# Patient Record
(demographics unavailable — no encounter records)

---

## 2018-06-05 NOTE — US
EXAMINATION TYPE: US venous doppler duplex LE BI

 

DATE OF EXAM: 6/5/2018 6:07 PM

 

COMPARISON: NONE

 

CLINICAL HISTORY: M79.661 Rt low leg pain,R06.02 Shortness of breath. Right leg pain

 

SIDE PERFORMED: Right  

 

TECHNIQUE:  The lower extremity deep venous system is examined utilizing real time linear array sonog
mino with graded compression, doppler sonography and color-flow sonography.

 

VESSELS IMAGED:

External Iliac Vein (EIV)

Common Femoral Vein

Deep Femoral Vein

Greater Saphenous Vein *

Femoral Vein

Popliteal Vein

Small Saphenous Vein *

Proximal Calf Veins

(* superficial vessels)

 

 

 

Right Leg:  Negative for DVT

 

 

No evidence of DVT right leg.

 

IMPRESSION:

1. Right lower extremity ultrasound negative for deep venous thrombosis

## 2018-06-06 NOTE — CT
EXAMINATION TYPE: CT chest wo con

 

DATE OF EXAM: 6/5/2018

 

COMPARISON: Chest radiograph dated 9/25/2016

 

HISTORY: Shortness of breath, abnormal cxr at an outside institution.

 

CT DLP: 127.7 mGycm.  Automated Exposure Control for Dose Reduction was Utilized.

 

 

TECHNIQUE:  CT scan of the thorax is performed without IV contrast.

 

FINDINGS:

 

LUNGS: Subpleural reticulation is seen throughout the lungs more pronounced on the right than the lef
t. Very mild lower lobe bronchiectasis is noted. Biapical pleural parenchymal scarring is mild. The l
ungs are grossly clear, there is no concerning parenchymal mass or nodule identified.   There is no p
leural effusion or pneumothorax seen.  The tracheobronchial tree is patent.

 

MEDIASTINUM: Ascending thoracic aorta is enlarged measuring 4.3 cm on coronal series 6 image 26. Desc
ending thoracic aorta is tortuous but within normal limits measuring 3.0 cm. Moderate atherosclerosis
 is seen of the thoracic aorta. Main pulmonary artery is also within normal limits. Severe three-vess
el coronary artery calcifications are seen. Prominent 1.2 cm short axis precarinal lymph node is note
d. Lack of IV contrast is noted to limit evaluation for mediastinal and especially hilar adenopathy. 
No axillary adenopathy is seen. No cardiomegaly or pericardial effusion is seen. 

 

OTHER: There is minimal bilateral overall symmetric retroareolar gynecomastia moderate multilevel deg
enerative changes of the thoracic spine are noted.

 

IMPRESSION: 

1. Ascending thoracic aortic aneurysm (4.3 cm). Surveillance is recommended.

2. Subpleural reticulation throughout the lungs and may represent atelectasis, can be seen physiologi
susan in aging independent of smoking, or may represent early fibrosis. No focal consolidation or pul
monary mass.

3. Solitary prominent mediastinal lymph node, low suspicion for malignancy. This may be reactive.

## 2018-12-07 NOTE — XR
EXAMINATION TYPE: XR abdomen acute w cxr

 

DATE OF EXAM: 12/7/2018

 

COMPARISON: None

 

HISTORY: Abdominal cramping

 

TECHNIQUE: Acute abdominal series performed with a frontal chest upright and supine views of the abdo
men.

 

FINDINGS: Nonspecific bowel gas is present. Moderate fecal debris is within the colon to the level th
e rectum. Psoas margins are normal. Organomegaly is not present.

 

No free air is evident. No suspicious differential air-fluid levels are present. Organomegaly is not 
present. Scoliosis is within the thoracolumbar spine. 2 surgical screws are within the left hip.

 

No free air is under the diaphragm. Heart size is normal. Lung fields are clear.

 

IMPRESSION:

1.  Moderate fecal retention.

2. Acute abdominal series is otherwise unremarkable.

## 2018-12-07 NOTE — ED
Nausea/Vomiting/Diarrhea HPI





- General


Chief complaint: Nausea/Vomiting/Diarrhea


Stated complaint: diarrhea


Time Seen by Provider: 12/07/18 11:08


Source: patient, RN notes reviewed, old records reviewed


Mode of arrival: ambulatory


Limitations: no limitations





- History of Present Illness


Initial comments: 





This is a 80-year-old male the ER for evasive nausea vomiting diarrhea.  No 

recent travel history no known sick contacts, no family members with similar 

complaints.  No fevers.  Patient states his symptoms are much improved 

currently he was having some difficulty with urination secondary dehydration.  

Otherwise patient denies any significant complaints of fever, no prior surgical 

history


MD complaint: nausea, vomiting


-: days(s) (2)


Description of Vomiting: food contents, watery


Description of Diarrhea: water


Associated Abdominal Pain: Yes


Location: diffuse


Severity: mild


Severity scale (1-10): 1


Quality: cramping, aching


Consistency: constant


Improves with: none


Worsens with: eating, movement


Context: other (unknown)


Associated Symptoms: nausea/vomiting, weakness





- Related Data


 Home Medications











 Medication  Instructions  Recorded  Confirmed


 


Metoprolol Tartrate [Lopressor] 12.5 mg PO Q48H 12/07/18 12/07/18








 Previous Rx's











 Medication  Instructions  Recorded


 


Flecainide [Tambocor] 50 mg PO Q12HR #60 tab 09/13/16











 Allergies











Allergy/AdvReac Type Severity Reaction Status Date / Time


 


Penicillins Allergy  Rash/Hives Verified 12/07/18 11:01


 


apixaban [From Eliquis] AdvReac  WEAKNESS Verified 12/07/18 11:01














Review of Systems


ROS Statement: 


Those systems with pertinent positive or pertinent negative responses have been 

documented in the HPI.





ROS Other: All systems not noted in ROS Statement are negative.





Past Medical History


Past Medical History: Atrial Fibrillation, Hypertension, Renal Disease


Additional Past Medical History / Comment(s): MALARIA WHEN IN [place]


History of Any Multi-Drug Resistant Organisms: None Reported


Past Surgical History: Hernia Repair


Additional Past Surgical History / Comment(s): lt hip surgery,ing hernia repair

, rt arm sx


Additional Past Anesthesia/Blood Transfusion Reaction / Comment(s): no past 

blood transfusion


Past Psychological History: No Psychological Hx Reported


Smoking Status: Never smoker


Past Alcohol Use History: None Reported


Past Drug Use History: None Reported





- Past Family History


  ** Father


Family Medical History: Myocardial Infarction (MI)





  ** Mother


Family Medical History: No Reported History





General Exam


Limitations: no limitations


General appearance: alert, in no apparent distress


Head exam: Present: atraumatic, normocephalic, normal inspection


Eye exam: Present: normal appearance, PERRL, EOMI.  Absent: scleral icterus, 

conjunctival injection, periorbital swelling


ENT exam: Present: normal exam, mucous membranes moist


Neck exam: Present: normal inspection.  Absent: tenderness, meningismus, 

lymphadenopathy


Respiratory exam: Present: normal lung sounds bilaterally.  Absent: respiratory 

distress, wheezes, rales, rhonchi, stridor


Cardiovascular Exam: Present: regular rate, normal rhythm, normal heart sounds.

  Absent: systolic murmur, diastolic murmur, rubs, gallop, clicks


GI/Abdominal exam: Present: soft, normal bowel sounds.  Absent: distended, 

tenderness, guarding, rebound, rigid


Extremities exam: Present: normal inspection, full ROM, normal capillary 

refill.  Absent: tenderness, pedal edema, joint swelling, calf tenderness


Back exam: Present: normal inspection


Neurological exam: Present: alert, oriented X3, CN II-XII intact


Psychiatric exam: Present: normal affect, normal mood


Skin exam: Present: warm, dry, intact, normal color.  Absent: rash





Course


 Vital Signs











  12/07/18





  10:47


 


Temperature 97.8 F


 


Pulse Rate 91


 


Respiratory 18





Rate 


 


Blood Pressure 147/97


 


O2 Sat by Pulse 99





Oximetry 














Medical Decision Making





- Medical Decision Making





88 male the ER for evasive nausea vomiting diarrhea, patient without 

significant symptoms nausea vomiting or diarrhea here in the ER, x-ray negative 

for any sort of obstruction no abdominal pain.  Mild dehydration, patient given 

adequate fluid here in the ER will be discharged home





- Lab Data


Result diagrams: 


 12/07/18 11:37





 12/07/18 11:37


 Lab Results











  12/07/18 12/07/18 12/07/18 Range/Units





  11:37 11:37 11:37 


 


WBC   10.3   (3.8-10.6)  k/uL


 


RBC   5.17   (4.30-5.90)  m/uL


 


Hgb   16.6   (13.0-17.5)  gm/dL


 


Hct   51.3   (39.0-53.0)  %


 


MCV   99.3   (80.0-100.0)  fL


 


MCH   32.0   (25.0-35.0)  pg


 


MCHC   32.2   (31.0-37.0)  g/dL


 


RDW   12.6   (11.5-15.5)  %


 


Plt Count   94 L   (150-450)  k/uL


 


Neutrophils %   71   %


 


Lymphocytes %   19   %


 


Monocytes %   7   %


 


Eosinophils %   1   %


 


Basophils %   0   %


 


Neutrophils #   7.3   (1.3-7.7)  k/uL


 


Lymphocytes #   1.9   (1.0-4.8)  k/uL


 


Monocytes #   0.7   (0-1.0)  k/uL


 


Eosinophils #   0.1   (0-0.7)  k/uL


 


Basophils #   0.0   (0-0.2)  k/uL


 


Manual Slide Review   Performed   


 


RBC Morphology   Normal   


 


Sodium    138  (137-145)  mmol/L


 


Potassium    4.8  (3.5-5.1)  mmol/L


 


Chloride    107  ()  mmol/L


 


Carbon Dioxide    20 L  (22-30)  mmol/L


 


Anion Gap    11  mmol/L


 


BUN    18  (9-20)  mg/dL


 


Creatinine    1.43 H  (0.66-1.25)  mg/dL


 


Est GFR (CKD-EPI)AfAm    51  (>60 ml/min/1.73 sqM)  


 


Est GFR (CKD-EPI)NonAf    44  (>60 ml/min/1.73 sqM)  


 


Glucose    95  (74-99)  mg/dL


 


Calcium    9.4  (8.4-10.2)  mg/dL


 


Phosphorus    3.3  (2.5-4.5)  mg/dL


 


Magnesium    1.7  (1.6-2.3)  mg/dL


 


Total Bilirubin    1.2  (0.2-1.3)  mg/dL


 


AST    55  (17-59)  U/L


 


ALT    10 L  (21-72)  U/L


 


Alkaline Phosphatase    80  ()  U/L


 


Total Creatine Kinase  252 H    ()  U/L


 


CK-MB (CK-2)  2.6 H    (0.0-2.4)  ng/mL


 


CK-MB (CK-2) Rel Index  1.0    


 


Troponin I  <0.012    (0.000-0.034)  ng/mL


 


Total Protein    7.8  (6.3-8.2)  g/dL


 


Albumin    4.1  (3.5-5.0)  g/dL














- EKG Data


-: EKG Interpreted by Me (EKG shows sinus rhythm rate of 76, , QRS 96, 

QTc 438)





- Radiology Data


Radiology results: report reviewed (X-ray abdominal series and chest shows mild 

fecal retention negative for significant acute disease), image reviewed





Disposition


Clinical Impression: 


 Gastroenteritis, Dehydration





Disposition: HOME SELF-CARE


Condition: Good


Instructions:  Acute Nausea and Vomiting (ED), Acute Diarrhea (ED)


Is patient prescribed a controlled substance at d/c from ED?: No


Referrals: 


Sammy Burris MD [Primary Care Provider] - 1-2 days

## 2018-12-09 NOTE — ED
General Adult HPI





- General


Source: patient, family, EMS, RN notes reviewed


Mode of arrival: EMS


Limitations: no limitations





<Mingo Hunter - Last Filed: 12/09/18 20:57>





<Simon Blanco - Last Filed: 12/09/18 23:13>





- General


Chief complaint: Back Pain/Injury


Stated complaint: weakness


Time Seen by Provider: 12/09/18 20:09





- History of Present Illness


Initial comments: 





Patient is a pleasant 88-year-old male presenting to the emergency department 

of fever.  Patient was in the emergency department recently.  Patient had 

constipation at that time followed by several episodes of diarrhea.  Following 

that patient did have a large bowel movement that was somewhat normal and has 

been feeling well for the past couple of days.  Today patient has felt fatigued 

and achy.  Patient was found to have fever.  Patient did have some discomfort 

in the sacral region externally that has resolved when he got up to walk.  

Patient has no discomfort at all at this time.  No rash noted.  No chest pain 

or dyspnea.  No cough.  No upper a story symptoms.  No abdominal pain.  No 

dysuria. (Mingo Hunter)





- Related Data


 Home Medications











 Medication  Instructions  Recorded  Confirmed


 


Metoprolol Tartrate [Lopressor] 12.5 mg PO Q48H 12/07/18 12/09/18








 Previous Rx's











 Medication  Instructions  Recorded


 


Flecainide [Tambocor] 50 mg PO Q12HR #60 tab 09/13/16


 


Polyethylene Glycol 3350 [Miralax] 17 gm PO DAILY #14 packet 12/07/18











 Allergies











Allergy/AdvReac Type Severity Reaction Status Date / Time


 


gentamicin Allergy  Unknown Verified 12/09/18 20:10


 


Penicillins Allergy  Rash/Hives Verified 12/09/18 20:10


 


apixaban [From Eliquis] AdvReac  WEAKNESS Verified 12/09/18 20:10


 


sertraline [From Zoloft] AdvReac  Hallucinati Verified 12/09/18 20:10





   ons  














Review of Systems


ROS Other: All systems not noted in ROS Statement are negative.


Constitutional: Reports: fever, chills


Eyes: Denies: eye pain


ENT: Denies: ear pain


Respiratory: Denies: cough, dyspnea


Cardiovascular: Denies: chest pain


Endocrine: Denies: fatigue


Gastrointestinal: Denies: abdominal pain


Genitourinary: Denies: urgency, dysuria, frequency


Musculoskeletal: Reports: as per HPI


Skin: Denies: rash





<Mingo Hunter - Last Filed: 12/09/18 20:57>


ROS Other: All systems not noted in ROS Statement are negative.





<Simon Blanco BIBI - Last Filed: 12/09/18 23:13>


ROS Statement: 


Those systems with pertinent positive or pertinent negative responses have been 

documented in the HPI.








Past Medical History


Past Medical History: Atrial Fibrillation, Hypertension, Renal Disease


Additional Past Medical History / Comment(s): MALARIA WHEN IN [place]


History of Any Multi-Drug Resistant Organisms: None Reported


Past Surgical History: Hernia Repair


Additional Past Surgical History / Comment(s): lt hip surgery,ing hernia repair

, left arm sx


Additional Past Anesthesia/Blood Transfusion Reaction / Comment(s): no past 

blood transfusion


Past Psychological History: No Psychological Hx Reported


Smoking Status: Never smoker


Past Alcohol Use History: None Reported


Past Drug Use History: None Reported





- Past Family History


  ** Father


Family Medical History: Myocardial Infarction (MI)





  ** Mother


Family Medical History: No Reported History





<Mingo Hunter - Last Filed: 12/09/18 20:57>





General Exam


Limitations: no limitations


General appearance: alert, in no apparent distress


Head exam: Present: atraumatic


Eye exam: Present: normal appearance, PERRL


ENT exam: Present: normal oropharynx


Neck exam: Present: normal inspection.  Absent: tenderness, meningismus


Respiratory exam: Present: normal lung sounds bilaterally


Cardiovascular Exam: Present: regular rate, normal rhythm


GI/Abdominal exam: Present: soft.  Absent: tenderness


Extremities exam: Present: normal inspection


Back exam: Present: normal inspection, other (Specifically no tenderness or 

rash or abscess or abnormality in the patient's area of previous discomfort at 

the sacrum.).  Absent: tenderness, rash noted


Neurological exam: Present: alert


Psychiatric exam: Present: normal affect, normal mood


Skin exam: Present: normal color.  Absent: rash





<Mingo Hunter - Last Filed: 12/09/18 20:57>





 Vital Signs











  12/09/18 12/09/18 12/09/18





  19:56 19:57 20:10


 


Temperature 100.4 F H  


 


Pulse Rate 97  79


 


Respiratory   12





Rate   


 


Blood Pressure 154/91 154/91 140/73


 


O2 Sat by Pulse 97 92 L 





Oximetry   














  12/09/18 12/09/18 12/09/18





  20:20 20:40 20:50


 


Temperature   


 


Pulse Rate 78 74 75


 


Respiratory 20 24 20





Rate   


 


Blood Pressure 108/64 108/58 103/59


 


O2 Sat by Pulse   





Oximetry   














  12/09/18 12/09/18 12/09/18





  21:20 21:30 21:40


 


Temperature   


 


Pulse Rate 78 75 74


 


Respiratory 18 20 22





Rate   


 


Blood Pressure 109/76 109/76 103/60


 


O2 Sat by Pulse 93 L 95 95





Oximetry   














  12/09/18 12/09/18





  21:50 22:10


 


Temperature  


 


Pulse Rate 74 73


 


Respiratory 20 15





Rate  


 


Blood Pressure 99/58 95/58


 


O2 Sat by Pulse 95 94 L





Oximetry  














EKG Findings





- EKG Comments:


EKG Findings:: Normal sinus rhythm 76.  , for screening AV block.  QRS 

94.  .  QTc 445.  Normal axis.  RSR V1.  T-wave inversion septally.  No 

acute ST change.





<Mingo Hunter - Last Filed: 12/09/18 20:57>





Medical Decision Making





- Lab Data


Result diagrams: 


 12/09/18 20:05








<Mingo Hunter - Last Filed: 12/09/18 20:57>





- Lab Data


Result diagrams: 


 12/09/18 20:05





 12/09/18 20:05





<Simon Blanco - Last Filed: 12/09/18 23:13>





- Medical Decision Making





88-year-old male presenting with sacral pain and fever.  Patient's care was 

signed out at shift change awaiting laboratory studies and imaging.  Laboratory 

studies obtained, patient is elevated white blood cell count 11.9, lactic acid 

of 3.3.  Electrolytes within normal limits.  Urinalysis does show 19 white 

cells.  Both blood culture and urine culture are pending.  CT is performed 

which shows colitis in the rectum as well as a nonobstructing left inguinal 

hernia.  Patient's symptoms likely related to colitis.  He was started on 

Levaquin for urine culture results.  He will be admitted for IV antibiotics, 

and IV fluids.  Case is discussed with admitting physician Dr. Burris, he will 

accept admission.  Gastroenterology placed on consult. (Simon Blanco)





- Lab Data





 Lab Results











  12/09/18 12/09/18 12/09/18 Range/Units





  20:05 20:05 20:05 


 


WBC   11.9 H   (3.8-10.6)  k/uL


 


RBC   4.80   (4.30-5.90)  m/uL


 


Hgb   15.0   (13.0-17.5)  gm/dL


 


Hct   47.5   (39.0-53.0)  %


 


MCV   99.0   (80.0-100.0)  fL


 


MCH   31.4   (25.0-35.0)  pg


 


MCHC   31.7   (31.0-37.0)  g/dL


 


RDW   12.6   (11.5-15.5)  %


 


Plt Count   110 L   (150-450)  k/uL


 


Neutrophils %   83   %


 


Lymphocytes %   10   %


 


Monocytes %   5   %


 


Eosinophils %   1   %


 


Basophils %   0   %


 


Neutrophils #   9.9 H   (1.3-7.7)  k/uL


 


Lymphocytes #   1.2   (1.0-4.8)  k/uL


 


Monocytes #   0.6   (0-1.0)  k/uL


 


Eosinophils #   0.1   (0-0.7)  k/uL


 


Basophils #   0.0   (0-0.2)  k/uL


 


PT     (9.0-12.0)  sec


 


INR     (<1.2)  


 


APTT     (22.0-30.0)  sec


 


Sodium    138  (137-145)  mmol/L


 


Potassium    4.6  (3.5-5.1)  mmol/L


 


Chloride    102  ()  mmol/L


 


Carbon Dioxide    24  (22-30)  mmol/L


 


Anion Gap    12  mmol/L


 


BUN    20  (9-20)  mg/dL


 


Creatinine    1.28 H  (0.66-1.25)  mg/dL


 


Est GFR (CKD-EPI)AfAm    57  (>60 ml/min/1.73 sqM)  


 


Est GFR (CKD-EPI)NonAf    50  (>60 ml/min/1.73 sqM)  


 


Glucose    99  (74-99)  mg/dL


 


Plasma Lactic Acid Anival     (0.7-2.0)  mmol/L


 


Calcium    8.6  (8.4-10.2)  mg/dL


 


Total Bilirubin    0.6  (0.2-1.3)  mg/dL


 


AST    35  (17-59)  U/L


 


ALT    23  (21-72)  U/L


 


Alkaline Phosphatase    67  ()  U/L


 


Total Creatine Kinase  341 H    ()  U/L


 


CK-MB (CK-2)  3.1 H    (0.0-2.4)  ng/mL


 


CK-MB (CK-2) Rel Index  0.9    


 


Troponin I  <0.012    (0.000-0.034)  ng/mL


 


Total Protein    7.0  (6.3-8.2)  g/dL


 


Albumin    3.9  (3.5-5.0)  g/dL


 


Urine Color     


 


Urine Appearance     (Clear)  


 


Urine pH     (5.0-8.0)  


 


Ur Specific Gravity     (1.001-1.035)  


 


Urine Protein     (Negative)  


 


Urine Glucose (UA)     (Negative)  


 


Urine Ketones     (Negative)  


 


Urine Blood     (Negative)  


 


Urine Nitrite     (Negative)  


 


Urine Bilirubin     (Negative)  


 


Urine Urobilinogen     (<2.0)  mg/dL


 


Ur Leukocyte Esterase     (Negative)  


 


Urine RBC     (0-5)  /hpf


 


Urine WBC     (0-5)  /hpf


 


Urine Mucus     (None)  /hpf


 


Influenza Type A RNA     (Not Detectd)  


 


Influenza Type B (PCR)     (Not Detectd)  














  12/09/18 12/09/18 12/09/18 Range/Units





  20:05 20:05 20:50 


 


WBC     (3.8-10.6)  k/uL


 


RBC     (4.30-5.90)  m/uL


 


Hgb     (13.0-17.5)  gm/dL


 


Hct     (39.0-53.0)  %


 


MCV     (80.0-100.0)  fL


 


MCH     (25.0-35.0)  pg


 


MCHC     (31.0-37.0)  g/dL


 


RDW     (11.5-15.5)  %


 


Plt Count     (150-450)  k/uL


 


Neutrophils %     %


 


Lymphocytes %     %


 


Monocytes %     %


 


Eosinophils %     %


 


Basophils %     %


 


Neutrophils #     (1.3-7.7)  k/uL


 


Lymphocytes #     (1.0-4.8)  k/uL


 


Monocytes #     (0-1.0)  k/uL


 


Eosinophils #     (0-0.7)  k/uL


 


Basophils #     (0-0.2)  k/uL


 


PT   10.2   (9.0-12.0)  sec


 


INR   0.9   (<1.2)  


 


APTT   23.4   (22.0-30.0)  sec


 


Sodium     (137-145)  mmol/L


 


Potassium     (3.5-5.1)  mmol/L


 


Chloride     ()  mmol/L


 


Carbon Dioxide     (22-30)  mmol/L


 


Anion Gap     mmol/L


 


BUN     (9-20)  mg/dL


 


Creatinine     (0.66-1.25)  mg/dL


 


Est GFR (CKD-EPI)AfAm     (>60 ml/min/1.73 sqM)  


 


Est GFR (CKD-EPI)NonAf     (>60 ml/min/1.73 sqM)  


 


Glucose     (74-99)  mg/dL


 


Plasma Lactic Acid Anival  3.3 H*    (0.7-2.0)  mmol/L


 


Calcium     (8.4-10.2)  mg/dL


 


Total Bilirubin     (0.2-1.3)  mg/dL


 


AST     (17-59)  U/L


 


ALT     (21-72)  U/L


 


Alkaline Phosphatase     ()  U/L


 


Total Creatine Kinase     ()  U/L


 


CK-MB (CK-2)     (0.0-2.4)  ng/mL


 


CK-MB (CK-2) Rel Index     


 


Troponin I     (0.000-0.034)  ng/mL


 


Total Protein     (6.3-8.2)  g/dL


 


Albumin     (3.5-5.0)  g/dL


 


Urine Color     


 


Urine Appearance     (Clear)  


 


Urine pH     (5.0-8.0)  


 


Ur Specific Gravity     (1.001-1.035)  


 


Urine Protein     (Negative)  


 


Urine Glucose (UA)     (Negative)  


 


Urine Ketones     (Negative)  


 


Urine Blood     (Negative)  


 


Urine Nitrite     (Negative)  


 


Urine Bilirubin     (Negative)  


 


Urine Urobilinogen     (<2.0)  mg/dL


 


Ur Leukocyte Esterase     (Negative)  


 


Urine RBC     (0-5)  /hpf


 


Urine WBC     (0-5)  /hpf


 


Urine Mucus     (None)  /hpf


 


Influenza Type A RNA    Not Detected  (Not Detectd)  


 


Influenza Type B (PCR)    Not Detected  (Not Detectd)  














  12/09/18 Range/Units





  21:08 


 


WBC   (3.8-10.6)  k/uL


 


RBC   (4.30-5.90)  m/uL


 


Hgb   (13.0-17.5)  gm/dL


 


Hct   (39.0-53.0)  %


 


MCV   (80.0-100.0)  fL


 


MCH   (25.0-35.0)  pg


 


MCHC   (31.0-37.0)  g/dL


 


RDW   (11.5-15.5)  %


 


Plt Count   (150-450)  k/uL


 


Neutrophils %   %


 


Lymphocytes %   %


 


Monocytes %   %


 


Eosinophils %   %


 


Basophils %   %


 


Neutrophils #   (1.3-7.7)  k/uL


 


Lymphocytes #   (1.0-4.8)  k/uL


 


Monocytes #   (0-1.0)  k/uL


 


Eosinophils #   (0-0.7)  k/uL


 


Basophils #   (0-0.2)  k/uL


 


PT   (9.0-12.0)  sec


 


INR   (<1.2)  


 


APTT   (22.0-30.0)  sec


 


Sodium   (137-145)  mmol/L


 


Potassium   (3.5-5.1)  mmol/L


 


Chloride   ()  mmol/L


 


Carbon Dioxide   (22-30)  mmol/L


 


Anion Gap   mmol/L


 


BUN   (9-20)  mg/dL


 


Creatinine   (0.66-1.25)  mg/dL


 


Est GFR (CKD-EPI)AfAm   (>60 ml/min/1.73 sqM)  


 


Est GFR (CKD-EPI)NonAf   (>60 ml/min/1.73 sqM)  


 


Glucose   (74-99)  mg/dL


 


Plasma Lactic Acid Anival   (0.7-2.0)  mmol/L


 


Calcium   (8.4-10.2)  mg/dL


 


Total Bilirubin   (0.2-1.3)  mg/dL


 


AST   (17-59)  U/L


 


ALT   (21-72)  U/L


 


Alkaline Phosphatase   ()  U/L


 


Total Creatine Kinase   ()  U/L


 


CK-MB (CK-2)   (0.0-2.4)  ng/mL


 


CK-MB (CK-2) Rel Index   


 


Troponin I   (0.000-0.034)  ng/mL


 


Total Protein   (6.3-8.2)  g/dL


 


Albumin   (3.5-5.0)  g/dL


 


Urine Color  Yellow  


 


Urine Appearance  Clear  (Clear)  


 


Urine pH  5.5  (5.0-8.0)  


 


Ur Specific Gravity  1.011  (1.001-1.035)  


 


Urine Protein  Negative  (Negative)  


 


Urine Glucose (UA)  Negative  (Negative)  


 


Urine Ketones  Negative  (Negative)  


 


Urine Blood  Negative  (Negative)  


 


Urine Nitrite  Negative  (Negative)  


 


Urine Bilirubin  Negative  (Negative)  


 


Urine Urobilinogen  <2.0  (<2.0)  mg/dL


 


Ur Leukocyte Esterase  Small H  (Negative)  


 


Urine RBC  1  (0-5)  /hpf


 


Urine WBC  19 H  (0-5)  /hpf


 


Urine Mucus  Rare H  (None)  /hpf


 


Influenza Type A RNA   (Not Detectd)  


 


Influenza Type B (PCR)   (Not Detectd)  














Disposition





<Mingo Hunter - Last Filed: 12/09/18 20:57>


Is patient prescribed a controlled substance at d/c from ED?: No


Decision to Admit Reason: Admit from EC


Decision Date: 12/09/18


Decision Time: 23:13





<Simon Blanco - Last Filed: 12/09/18 23:13>


Clinical Impression: 


 Dehydration, Colitis





Disposition: ADMITTED AS IP TO THIS HOSP


Condition: Stable


Referrals: 


Sammy Burris MD [Primary Care Provider] - 1-2 days

## 2018-12-09 NOTE — CT
EXAMINATION TYPE: CT abdomen pelvis w con

 

DATE OF EXAM: 12/9/2018

 

COMPARISON: None

 

HISTORY: Fever and weakness.

 

CT DLP: 578.6 mGycm

Automated exposure control for dose reduction was used.

 

TECHNIQUE:  Helical acquisition of images was performed from the lung bases through the pelvis.

 

CONTRAST: 

Performed without Oral Contrast and with IV Contrast, patient injected with 80ml mL of Isovue 300.

 

FINDINGS: 

 

There is subsegmental atelectasis at the lung bases. Heart is enlarged. There is no pericardial effus
ion. Liver shows no focal defect. Gallbladder appears normal. Spleen appears normal. There is no panc
reatic mass.

 

There is no adrenal mass. There are small bilateral renal cortical cysts. There is no hydronephrosis.
 There is no retroperitoneal adenopathy. Abdominal aorta is atheromatous. Bladder distends smoothly. 
There is left side inguinal hernia and scrotal hernia. This contains descending colon and fat. I see 
no evidence of a bowel obstruction. There is a left hip nailing noted. There is no free fluid in the 
pelvis. There is mild wall thickening of the rectum.

 

Lumbar vertebra have normal spacing. There is no compression fracture. Posterior elements are intact.
 Bony pelvis is intact. There is mild thoracolumbar dextroscoliosis.

 

There is no evidence of mesenteric edema or adenopathy. There is no evidence of a bowel obstruction.

IMPRESSION: 

THERE IS WALL THICKENING OF THE RECTUM THAT COULD RELATE TO FOCAL COLITIS.

 

LEFT INGUINAL HERNIA WITHOUT EVIDENCE OF OBSTRUCTION OF THE DESCENDING COLON.

 

PATCHY SUBSEGMENTAL ATELECTASIS AT THE LUNG BASES. CARDIOMEGALY.

## 2018-12-09 NOTE — XR
EXAMINATION TYPE: XR chest 2V

 

DATE OF EXAM: 12/9/2018

 

COMPARISON: 9/25/2016

 

HISTORY: Fever

 

TECHNIQUE:  Frontal and lateral views of the chest are obtained.

 

FINDINGS:  There is no heart failure nor confluent pneumonic infiltrate. Thoracic aorta is atheromato
us and tortuous. Costophrenic angles are clear. Bony thorax appears intact.

 

IMPRESSION:  No active cardiopulmonary disease. There is clearing of the pleural fluid and mild pulmo
nary congestion compared to old exam.

## 2018-12-10 NOTE — HP
HISTORY AND PHYSICAL



DATE OF SERVICE:

12/10/2018





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



DATA:  FULL CODE.  He is 5 foot 5 inches height.  His weight 62.142 kg, BSA 1.68 m2,

BMI 22.8 kg/m2.



His allergy to GENTAMICIN, PENICILLIN, APIXABAN, SERTRALINE.

He has no allergy to cephalosporin.



CHIEF COMPLAINT:

Patient presented to the emergency room with the underlying abdominal pain, as well as

that he felt weak and feverish and chills.



HISTORY OF PRESENT ILLNESS:

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

has at that time abdominal pain, where he has as well feeling of constipation and he

had at that time, Dulcolax tablet to them and resulted in severe lower abdominal pain.

After his visit, he went home and he was stable at the time and he had a large bowel

movement and he felt comfortable.



Yesterday. Sunday, 12/09, he went exercise walking in the mall and with his return

back, he had a shower, but after the shower, he felt shivering and this shivering did

not stop. That was happening around 4:30 pm to 7 pm.  Subsequently they called the

ambulance, his wife and took him to the emergency room.  In the emergency room and

found that his blood pressure was low at the time of presentation and the blood

pressure was her his temperature as well was 100.4. blood pressure started as level of

154/91 and subsequently gradually dropped to 108/58 and with instability, his oxygen

saturation also went down from 97 to 92.  And the patient with this finding and the

abdominal pain and shivering underwent CT scan of the abdomen.  The CT scan of the

abdomen was indicating that he has underlying wall thickening in the rectum and could

be focal colitis at the time.  With the history of constipation and after that, large

frequent bowel movement until he was cleaned up and could be that is irritation.

However, the hypotension that occurred subsequently that could be another issue.  Found

also in the CT scan that he had the left inguinal hernia and without evidence of

obstruction of the descending colon.  Also, he had a patchy atelectasis in the lung

bases with cardiomegaly.  In the ER as well, we did a chest x-ray and the chest x-ray

was indicating mild pulmonary congestion compared with the old exam.  No active

pulmonary disease.  His EKG showed to be normal sinus rhythm with incomplete right

bundle branch block and nonspecific ST wave abnormality with the abnormal EKG and the

patient has been on flecainide by Dr. Mercedes, the cardiologist, with a history of

atrial fibrillation in the past and he has been sinus since he has been taken this

medication.  He had history of mild hypertension in the past, but he has currently

hypotension.  He, subsequently, as seen in the emergency room and the impression that

presented to them was fever and patient had has a history mentioned above, history of

constipation and followed by several diarrhea and large bowel movements.  Patient felt

fatigued and achy and the pain was in the sacral area and was burning sensation on the

anal rectal area.  However, he walked and he felt resolved.  No chest pain and no cough

and no abdominal pain at the time of presentation to the emergency room.



Current rest of medication is metoprolol tartrate 12.5 mg p.o. every 48 hours.  He is

on flecainide, which is Tambocor which is flecainide 50 mg twice a day and he is on

polyethylene glycol, MiraLAX, which has been stopped on the admission.



He is allergic to GENTAMICIN, PENICILLIN, APIXABAN, and SERTRALINE.



PAST MEDICAL HISTORY:

Left hip surgery with screw and pins with fracture.  The patient , he has 3

daughters.



REVIEW OF THE SYSTEM:

Essentially negative except constitutional he had a fever and chills.  Denied any pain

in the eye or the ear.  Denied any respiratory symptoms, cough and dyspnea.  Denied any

cardiovascular symptoms, chest pain or anginal pain. Endocrine was negative, but he

felt fatigued and tired and found that he has CPK, the muscle fraction was elevated,

which has been subsequently after he was exercising, walking in the mall.

Genitourinary, denied any urgency or dysuria or frequency and musculoskeletal, just

history of exercising in the mall.  Skin, no skin rash.  GI, the feeling of pain in the

sacral area and perianal area and burning feeling, probably could be related after he

had the frequent large bowel movement.



The past medical history, he had hernia repair.  He had also left hip surgery and he

had history of atrial fib, hypertension, and chronic kidney disease, mild; however,

today is progressive.  He has remote history of malaria and in [place], remote.  No

psychological history.



Never smoked.  No alcohol intake.  No drug use.



FAMILY HISTORY:

Father, myocardial infarction.  Mother, Raynaud's disease.



PHYSICAL EXAMINATION:

Patient was conscious, alert, oriented x3 at the time of the examination, his blood

pressure is still low in the 93/54 with a mean 67 and subsequently 91/56.  His pulse ox

was in the 92-93.  His pulse rate was 63-65.

HEENT:  The head was normocephalic, atraumatic.  Pupil was reactive and equal.

Conjunctivae was pink, sclerae was nonicteric and the oropharynx natural teeth and

uvula midline and normal tongue here.  Normal hearing and neck was supple.  No

lymphadenopathy.  Trachea midline.  No thyromegaly.  Chest was clear to auscultation

and percussion.  No wheezes, no rhonchi.  The heart, PMI in the fifth intercostal space

and normal S1, S2.  No gallop.  The abdomen was soft with some epigastric discomfort

and lower abdomen discomfort in the abdomen is positive bowel sounds and not

tympanitic.

EXTREMITIES:  No edema, but decreased pulses on both dorsalis pedis and posterior

tibial as well as on the femoral and popliteal pulses.  The purplish discoloration of

the foot and hand with exposure to the cold with the underlying Raynaud's disease.  He

had also an onychomycosis of the toenails with nail dystrophy bilateral.



LABORATORY:

On admission indicating lactic acidosis with the lactic acid was 3.3 and; however when

we repeat again, it has improved to 0.8.  He had his leukocytosis with the WBC is 11.9

and the hemoglobin was normal 15, hematocrit 47.  Patient also had thrombocytopenia;

however, he has no bleeding tendency with the platelet count 110.  His neutrophil was

9.9, which is increased.  He has a PT and INR within normal limit and he has chemistry

profile on admission and that was indicating normal electrolyte with sodium 138, and

carbon dioxide was 24.  However, the creatinine is 1.28 and with the subsequent labs

indicating that progression of the creatinine with the normal glucose.  However, the

repeat again his blood sugar was 68.  The patient did not eat at that time.  The

underlying CK was 341 on admission with muscle fraction is 3.1, the CK-MB and that is

considered mild post-exercise rhabdo.  His troponin was normal less than 0.012 and the

total protein 7, albumin 3.9, and his urinalysis was indicating that he had a small

leukocyte esterase with white cells is 19, suspicious of pyuria was considered or

urinary tract infection including prostatitis.  Repeat lab this morning was indicating

that the creatinine went up with the estimated glomerular filtration rate for non-

 for him when he admitted was 50 and now it is 48.  We started him back

again on the IV fluid, which was discontinued during the night.



IMPRESSION:

1. Dehydration.

2. Acute kidney injury on the top of chronic with chronic kidney disease stage III.

3. Underlying mild elevation of creatine kinase considering mild rhabdo.  Lactic

    acidosis could be associated with a 3.3 lactic acid.  Peripheral vascular disease

    with pulses bilaterally decreased.

4. Thickening of the rectum by the CAT scan with the underlying focal colitis and

    possible proctitis with the symptoms of anal burning.

5. Thrombocytopenia with no active bleeding.

6. History of atrial fibrillation, currently sinus.

7. Incomplete right bundle branch block by the EKG.

8. Underlying left hip arthroplasty.

9. Currently, inguinal hernia by the CT scan; however, nonsymptomatic.



PLAN:

1. We are repeating laboratory.

2. Hydration with IV gentle hydration with IV fluids 75 mL/hour normal saline.

3. Monitoring the renal function.

4. Consultation with Dr. Samaniego/Gastroenterology/Dr. Valdez the new gastroenterology

    and I did discuss it with him personally that he will be seeing him today.  The

    underlying thrombocytopenia and without any active bleeding and will be checking

    again the laboratory tomorrow and his PT and INR is normal and we will be

    ambulating him from day 1 with no need for DVT prophylaxis as he always active for

    ambulation.

5. We will be obtaining bilateral arterial duplex as SORIN, we do not do it in the

    hospital and we will get the ultrasound of the arterial side.

6. Tomorrow will check also his CPK for a solution of the mild rhabdo.

7. Patient was started in the ER on Levaquin with the history of proctitis and burning

    inflammation, will be changing the antibiotic to Rocephin as well as Rocephin, and

    Flagyl until seen by the Gastroenterology and we will wait for the results of the

    arterial duplex study of the lower extremity.  Further treatment depends on the

    patient's condition.





MMODL / IJN: [id number] / Job#: [id number]

## 2018-12-11 NOTE — P.DS
Providers


Date of admission: 


12/09/18 23:09





Attending physician: 


Sammy Burris





Consults: 





 





12/09/18 23:09


Consult Physician Routine 


   Consulting Provider: Emani Samaniego


   Consult Reason/Comments: Colitis


   Do you want consulting provider notified?: Yes, Notify in am











Primary care physician: 


Sammy Burris


This is dictation on discharge summary date of service 12/11/2018.


Final diagnosis


#1 hypotension #2 acute HTN recovering with the underlying chronic kidney 

disease stage III.


#3 proctitis associated with stool retention followed by diarrhea.


#4 atrial fibrillation with a controlled ventricular response rate of 70s.


#5 history of hypertension controlled.


#6 dehydration corrected.


#7 mild elevation of CPK associated with minimal rhabdo post exercise.


#8 lactic acidosis abnormal elevation 3.3 on admission.


#9 underlying Raynaud's phenomena with the purplish discoloration of the hand 

and feet with the cold weather associated with negative DNA and negative DNA.


Consultation with GI: Dr. gupta gastroenterologist.  


Patient presented with fever as well as feeding generalized weakness with the 

hypotension associated with the increase creatinine however patient also had 

computed tomography scan of the abdomen was contrast.


With the dehydration and rectal thickening consultation with the 

gastroenterology and started on IV Rocephin as well as Flagyl.  Patient 

hydrated with IV normal saline and given for one dose of steroid Solu-Medrol 

125 mg IV piggyback with the possibility of adrenal gland suppression meanwhile 

we obtain the cortisol level prior to the injection.


Subsequently blood pressure improved with from the the 90 systolic currently is 

135/67/134/67 with the saturation of the oxygen 98 and afebrile with the 

temperature 97.5 and pulse rate regular 71 with a respiratory rate 16.


His white count improved from 11.9-7.2 and hemoglobin stable 14 and hematocrit 

44.2.  His chemistry profile today indicating sodium 139 potassium 4.7 chloride 

110 carbon dioxide 22 BUN 18 creatinine 1.31 with estimated glomerular 

filtration rate 56 and his blood sugar was low currently his was 86 yesterday 

today is 141.  He is magnesium is low 1.5, patient to had elevated lactic acid 

on admission 3.3 with hydration resolved to 0.8.  His liver enzyme within 

normal limits CPKMB 2.4 his cortisol level was 6 and ANAs screen was negative 

and DNA double-stranded negative and the anti-DNA less than 1, influenza A not 

detected influenza B not detected.


Urine culture was negative.  After 24 hour period





Presentation to the emergency room all was 88 years old white male admitted 

with a fever and he had history of constipation came to the ER followed by 

diarrhea after he take 2 tablets of stool laxative.  He felt at the time of 

admission fever with lactic acid elevated 3.3 and dehydrated with elevated 

creatinine .


Patient subsequently admitted to the hospital hydrated and found that he had 

hypotension and continue the hydration and started on supporting the adrenal 

gland was 1 dose of Solu-Medrol 125 mg and a hyper hypotension has been result 

as well patient also did not take his metoprolol 12.5 every 48 hour and 

subsequently he felt fine and blood pressure today is stable.  Patient is 

ambulatory and patient did not need any DVT prophylaxis.  As he ambulatory in 

the lainez as well as in the room. 





On physical exam:


Patient's conscious alert oriented 3 and he feeling great at this time and 

that he wants to go home.


Vital sign today temperature 97.5 pulse 71 respiratory rate 16 blood pressure 

134/76 with a mean 95 he has pulse ox 98% on room air.


Laboratory discussed with the patient and the creatinine started to improve 

with the increase fluid intake he has underlying chronic kidney disease stage 

III with the estimated glomerular filtration rate 48, patient magnesium is 1.5 

and we will give him on a gram of magnesium prior to discharge.  And a 

creatinine 1.31 yesterday was 1.33.  Patient has also magnesium low 1.5 and 

will give him a gram of magnesium before discharge home today.





HEENT the head was normocephalic and atraumatic pupils equal reactive 

oropharynx was normal with the uvula midline normal tongue neck was supple no 

JVD no thyromegaly no lymphadenopathy trachea midline.


Chest was clear auscultation percussion.  Heart was regular sinus rhythm.  

Abdomen was soft nontender positive bowel sounds no perineal burning or pain no 

pain with urination and no suprapubic tenderness and able to ambulate and 

extremities no edema and positive pulses.


Neurologically: No headache no blurred vision no lateralizing sign no cranial 

nerve deficit and no motor or sensory deficit.





Patient stable general condition for discharge home today to follow-up with Dr. gupta the gastroenterology for the underlying proctitis.


Patient will be receiving Flagyl only 500 mg 3 times a day for 5 days.  And new


We'll be following help this regard We're in the office.  And increase activity 

and oral intake thank you is end of dictation [facility] 

end dictation


Patient Condition at Discharge: Stable





Plan - Discharge Summary


Discharge Rx Participant: No


New Discharge Prescriptions: 


No Action


   Flecainide [Tambocor] 50 mg PO Q12HR #60 tab


   Metoprolol Tartrate [Lopressor] 12.5 mg PO Q48H


   Polyethylene Glycol 3350 [Miralax] 17 gm PO DAILY #14 packet


Discharge Medication List





Flecainide [Tambocor] 50 mg PO Q12HR #60 tab 09/13/16 [Rx]


Metoprolol Tartrate [Lopressor] 12.5 mg PO Q48H 12/07/18 [History]


Polyethylene Glycol 3350 [Miralax] 17 gm PO DAILY #14 packet 12/07/18 [Rx]








Follow up Appointment(s)/Referral(s): 


Sammy Burris MD [Primary Care Provider] - 1-2 days

## 2018-12-11 NOTE — P.CONS
History of Present Illness





- Reason for Consult


Consult date: 12/10/18


Proctitis


Requesting physician: Sammy Burris





- Chief Complaint


Chills, fatigue





- History of Present Illness





The patient is a very pleasant 88-year-old male with medical history 

significant for atrial fibrillation and hypertension who presents to the 

hospital with complaints of chills and fatigue.  Per the patient she was 

recently seen on 12/70 in the emergency department after presenting for 

complaints of constipation.  The patient reports that he was very constipated 

and presented for impaction.  The patient subsequently had a bowel movement 

which was followed by loose stool which is currently resolved.  He presented 

back to the ER for further evaluation after feeling cold, chills and for 

fatigue.  In the emergency department the patient had a computed tomography 

scan of the abdomen which was significant for wall thickening of the rectum as 

well as a left inguinal hernia and atelectasis of the lungs.  The patient 

reports that currently his symptoms have resolved.  He reports that he does 

suffer from constipation at baseline and that starting laxative therapy.  He 

denies any abdominal pain.  He reports a remote history of a colonoscopy.  He 

denies any rectal or GI bleeding.  Hemoglobin and liver enzymes were normal on 

presentation.





Review of Systems





REVIEW OF SYSTEMS:


CARDIO: Denies any chest pain or palpitations, she does have a known history of 

atrial fibrillation.


PULMONARY: Denies any shortness of breath or wheezing.


GENITOURINARY:  No dysuria or hematuria. 


MUSCULOSKELETAL: No weakness reported. 


SKIN: Denies any new rashes or lesions, jaundice or pallor. 


PSYCHIATRIC: Denies any depression or anxiety. 


NEUROLOGY: Denies headache, denies any new focal deficits. 


EARS: No tinnitus, discharge or new hearing loss.


NOSE: No discharge or congestion.


EYES: No pain in eyes or change in vision. 


CONSTITUTIONAL: No recent weight loss. No fever, chills, night sweats.





Past Medical History


Past Medical History: Atrial Fibrillation, Hypertension


Additional Past Medical History / Comment(s): MALARIA WHEN IN [place]


History of Any Multi-Drug Resistant Organisms: None Reported


Past Surgical History: Hernia Repair


Additional Past Surgical History / Comment(s): lt hip surgery,ing hernia repair

, left elbow sx


Additional Past Anesthesia/Blood Transfusion Reaction / Comm: no past blood 

transfusion


Past Psychological History: No Psychological Hx Reported


Additional Psychological History / Comment(s): pt is a retired thoracic, 

vascular surgeon, lives with hsi wife, is independant.


Smoking Status: Never smoker


Past Alcohol Use History: None Reported


Past Drug Use History: None Reported





- Past Family History


  ** Father


Family Medical History: Myocardial Infarction (MI)





  ** Mother


Family Medical History: No Reported History





Medications and Allergies


 Home Medications











 Medication  Instructions  Recorded  Confirmed  Type


 


Flecainide [Tambocor] 50 mg PO Q12HR #60 tab 09/13/16 12/09/18 Rx


 


Metoprolol Tartrate [Lopressor] 12.5 mg PO Q48H 12/07/18 12/09/18 History


 


Polyethylene Glycol 3350 [Miralax] 17 gm PO DAILY #14 packet 12/07/18 12/09/18 

Rx











 Allergies











Allergy/AdvReac Type Severity Reaction Status Date / Time


 


gentamicin Allergy  Unknown Verified 12/09/18 20:10


 


Penicillins Allergy  Rash/Hives Verified 12/09/18 20:10


 


apixaban [From Eliquis] AdvReac  WEAKNESS Verified 12/09/18 20:10


 


sertraline [From Zoloft] AdvReac  Hallucinati Verified 12/09/18 20:10





   ons  














Physical Exam


Vitals: 


 Vital Signs











  Temp Pulse Pulse Pulse Resp BP BP


 


 12/10/18 20:09    64    


 


 12/10/18 20:02  98.0 F    68  12   111/58


 


 12/10/18 16:07      17  


 


 12/10/18 15:00  97.9 F    68  16   110/64


 


 12/10/18 07:00  98.4 F    60  17   107/68


 


 12/10/18 02:39  98.0 F    65  14   115/69


 


 12/10/18 02:00  99.4 F  73     104/63 


 


 12/10/18 01:40       91/56 


 


 12/10/18 01:30   63    19  96/67 


 


 12/10/18 01:20   65    6 L  96/67 


 


 12/10/18 01:10   64    19  98/60 


 


 12/10/18 01:00   63    21  93/54 


 


 12/10/18 00:50   64    21  93/54 


 


 12/10/18 00:40   66    19  95/58 


 


 12/10/18 00:20   65    16  100/62 


 


 12/10/18 00:10   67    20  92/55 


 


 12/10/18 00:00   69    16  102/60 


 


 12/09/18 23:50   74    16  84/56 














  Pulse Ox


 


 12/10/18 20:09 


 


 12/10/18 20:02  99


 


 12/10/18 16:07 


 


 12/10/18 15:00  100


 


 12/10/18 07:00  97


 


 12/10/18 02:39  99


 


 12/10/18 02:00  96


 


 12/10/18 01:40 


 


 12/10/18 01:30  93 L


 


 12/10/18 01:20  92 L


 


 12/10/18 01:10  92 L


 


 12/10/18 01:00  92 L


 


 12/10/18 00:50  93 L


 


 12/10/18 00:40  94 L


 


 12/10/18 00:20  93 L


 


 12/10/18 00:10  93 L


 


 12/10/18 00:00  93 L


 


 12/09/18 23:50  93 L








 Intake and Output











 12/10/18 12/10/18 12/11/18





 14:59 22:59 06:59


 


Other:   


 


  # Voids 1  














On physical examination, patient appears comfortable in no apparent distress. 


HEAD: Normocephalic, atraumatic. 


EYES: No scleral icterus. No conjunctival injection. 


MOUTH: No lesions, tongue midline. 


NECK: Trachea midline, no gross abnormalities. 


CHEST: Clear to auscultation with no wheezing or rhonchi appreciated. 


HEART: Irregularly irregular. 


ABDOMEN: Soft. Bowel sounds are positive. No organomegaly.  No guarding or 

rigidity.


EXTREMITIES: No pedal edema. 


SKIN: No rashes, no jaundice. 


NEUROLOGIC: Alert and oriented x3.  No focal deficits. 





Results


CBC & Chem 7: 


 12/09/18 20:05





 12/10/18 09:23


Labs: 


 Abnormal Lab Results - Last 24 Hours (Table)











  12/10/18 Range/Units





  09:23 


 


Creatinine  1.33 H  (0.66-1.25)  mg/dL


 


Glucose  68 L  (74-99)  mg/dL


 


Total Protein  6.1 L  (6.3-8.2)  g/dL


 


Albumin  3.1 L  (3.5-5.0)  g/dL








 Microbiology - Last 24 Hours (Table)











 12/09/18 20:05 Blood Culture - Preliminary





 Blood    No Growth after 24 hours


 


 12/09/18 21:08 Urine Culture - Preliminary





 Urine,Clean Catch 











CT scan - abdomen: report reviewed (computed tomography scan of the abdomen 

which was significant for wall thickening of the rectum as well as a left 

inguinal hernia and atelectasis of the lungs.)





Assessment and Plan


(1) Colitis


Narrative/Plan: 


Patient with a known history of constipation who was recently seen in the 

emergency department it is severe ulceration and impaction.  He presented back 

with symptoms of chills and fatigue and was found to have CT findings of rectal 

wall thickening area he recently had loose bowel movements after presenting 

with impaction.  Currently he is reporting that his symptoms are resolved.  

Unclear etiology and may represent local changes secondary to constipation and 

impaction, also consider infectious colitis or other etiology.


Current Visit: Yes   Status: Acute   Code(s): K52.9 - NONINFECTIVE 

GASTROENTERITIS AND COLITIS, UNSPECIFIED   SNOMED Code(s): 23294951


   





(2) Atrial fibrillation with RVR


Current Visit: No   Status: Acute   Code(s): I48.91 - UNSPECIFIED ATRIAL 

FIBRILLATION   SNOMED Code(s): 420921816114491


   


Plan: 





Supportive care


Okay for diet


Continue short course of antibiotics currently on Rocephin and Flagyl


Recommend outpatient follow-up with gastroenterology in consideration for 

colonoscopic evaluation


Recommend bowel regimen at home, have discussed the use of fiber 

supplementation with Benefiber or the addition of daily MiraLAX


Thank you for allowing us to participate in the care of this patient we will 

continue to follow

## 2018-12-12 NOTE — P.ARTDOP
Arterial Doppler





LOWER EXTREMITY ARTERIAL DOPPLER:





DATE OF SERVICE: 12/10/2018





Reason for study: Pain.





Doppler waveforms: Multiphasic bilaterally throughout.





Pulse volume recording: Normal configuration.





Pressure gradients: Mild gradient distally on the right.





Ankle-brachial indices: 0.86 on the right and greater than 1 on the left.





Toe pressures: [] on the right, [] on the left





Impression: Left side appears to be normal.  Right side shows possible mild 

femoral popliteal disease.  Not likely to be clinically significant..

## 2018-12-13 NOTE — CDI
Documentation Clarification Form



Date: 12/13/18

From: Neyda Chucho

Phone: [phone] Karyna Eugneia,  Hours-8:30 am & 5 pm M-F

MRN: [id number]

Admit Date: 12/9/2018 11:09:00 PM

Patient Name: Conner Kaba

Visit Number: [id number]

Discharge Date:  12/11/2018 3:21:00 PM





ATTENTION: The Clinical Documentation Specialists (CDI) and [facility] Coding Staff 
appreciate your assistance in clarifying documentation. Please respond to the 
clarification below the line at the bottom and electronically sign. The CDI & 
[facility] Coding staff will review the response and follow-up if needed. Please note: 
Queries are made part of the Legal Health Record. If you have any questions, 
please contact the author of this message via ITS.



Dr. Sammy Burris



Atrial Fibrillation is documented in the ED note, H&P, consult & DS.

History/Risk Factors: hypotension, ac renal failure, acidosis, dehydration, 
thrombocytopenia, HTN w cardiology w CKD III

EKG/telemetry: sinus, incomplete RBBB

Treatment:  Tambocor 50 mg po Q 12hr



In your professional opinion, can you please clarify the type of Atrial 
Fibrillation, if known?  



Chronic/Permanent

Paroxysmal

Persistent

Other, please specify ________

Unable to determine

__________________________________________________________________________ 
history of atrial fibrillation treated by cardiology was paroxysmal only and 
Dr. Wilkinson the cardiologist treated him with flecainide no farther atrial 
fibrillation and I don't have the exact event from the cardiology however 
currently he is sinus, and only was history of it, on the's admission I could 
not say if patient had atrial fib.  With the answer could be unable to 
determine.
MTDD